# Patient Record
Sex: FEMALE | URBAN - METROPOLITAN AREA
[De-identification: names, ages, dates, MRNs, and addresses within clinical notes are randomized per-mention and may not be internally consistent; named-entity substitution may affect disease eponyms.]

---

## 2022-07-11 ENCOUNTER — APPOINTMENT (RX ONLY)
Dept: URBAN - METROPOLITAN AREA CLINIC 73 | Facility: CLINIC | Age: 62
Setting detail: DERMATOLOGY
End: 2022-07-11

## 2022-07-11 DIAGNOSIS — L24 IRRITANT CONTACT DERMATITIS: ICD-10-CM

## 2022-07-11 DIAGNOSIS — L259 CONTACT DERMATITIS AND OTHER ECZEMA, UNSPECIFIED CAUSE: ICD-10-CM

## 2022-07-11 PROBLEM — L23.9 ALLERGIC CONTACT DERMATITIS, UNSPECIFIED CAUSE: Status: ACTIVE | Noted: 2022-07-11

## 2022-07-11 PROBLEM — L24.9 IRRITANT CONTACT DERMATITIS, UNSPECIFIED CAUSE: Status: ACTIVE | Noted: 2022-07-11

## 2022-07-11 PROCEDURE — ? PATIENT SPECIFIC COUNSELING

## 2022-07-11 PROCEDURE — ? COUNSELING

## 2022-07-11 PROCEDURE — ? TREATMENT REGIMEN

## 2022-07-11 PROCEDURE — ? PRESCRIPTION

## 2022-07-11 PROCEDURE — 99203 OFFICE O/P NEW LOW 30 MIN: CPT

## 2022-07-11 RX ORDER — CLOBETASOL PROPIONATE 0.5 MG/G
CREAM TOPICAL BID
Qty: 15 | Refills: 0 | Status: ERX | COMMUNITY
Start: 2022-07-11

## 2022-07-11 RX ADMIN — CLOBETASOL PROPIONATE: 0.5 CREAM TOPICAL at 00:00

## 2022-07-11 ASSESSMENT — LOCATION DETAILED DESCRIPTION DERM
LOCATION DETAILED: 1ST WEBSPACE LEFT FOOT
LOCATION DETAILED: LEFT MEDIAL GREAT TOE
LOCATION DETAILED: LEFT LATERAL EYEBROW

## 2022-07-11 ASSESSMENT — LOCATION SIMPLE DESCRIPTION DERM
LOCATION SIMPLE: LEFT GREAT TOE
LOCATION SIMPLE: LEFT FOOT
LOCATION SIMPLE: LEFT EYEBROW

## 2022-07-11 ASSESSMENT — LOCATION ZONE DERM
LOCATION ZONE: FEET
LOCATION ZONE: TOE
LOCATION ZONE: FACE

## 2022-07-11 NOTE — HPI: RASH
How Severe Is Your Rash?: moderate
Is This A New Presentation, Or A Follow-Up?: Rash
Additional History: Patient presents with rash reported as red, itchy, and swollen that is improving over time. Patient tried OTC HC with no improvement reported.
Additional History: Patient presents with rash reported as red, itchy, and tender to the touch. Patient tried OTC HC with no improvement reported.

## 2022-07-11 NOTE — PROCEDURE: PATIENT SPECIFIC COUNSELING
Detail Level: Detailed
No rash seen today.\\nPt had tried otc cortisone and reports rash resolved\\nDisc monitor for allergen triggers and take photos of rash if recurs \\nPt reports having a garden of peppers and remembered cutting peppers
- skin is healthy today\\n- pt reports no changes to any medications except a change gabapentin dosage\\n- pt denies joint pain\\n- pt denies trouble with walking\\n- will send topical steroid to help with swelling\\n\\nskin clinically wnl today however mild swelling is noted\\nper pt only uncomfortable with pressure from sandals

## 2022-11-18 ENCOUNTER — APPOINTMENT (RX ONLY)
Dept: URBAN - METROPOLITAN AREA CLINIC 73 | Facility: CLINIC | Age: 62
Setting detail: DERMATOLOGY
End: 2022-11-18

## 2022-11-18 DIAGNOSIS — L43.8 OTHER LICHEN PLANUS: ICD-10-CM

## 2022-11-18 PROBLEM — L30.9 DERMATITIS, UNSPECIFIED: Status: ACTIVE | Noted: 2022-11-18

## 2022-11-18 PROCEDURE — ? COUNSELING

## 2022-11-18 PROCEDURE — ? PRESCRIPTION

## 2022-11-18 PROCEDURE — ? TREATMENT REGIMEN

## 2022-11-18 PROCEDURE — 99214 OFFICE O/P EST MOD 30 MIN: CPT

## 2022-11-18 PROCEDURE — ? PATIENT SPECIFIC COUNSELING

## 2022-11-18 RX ORDER — CLOBETASOL PROPIONATE 0.05 MG/G
GEL TOPICAL
Qty: 15 | Refills: 2 | Status: ERX | COMMUNITY
Start: 2022-11-18

## 2022-11-18 RX ADMIN — CLOBETASOL PROPIONATE: 0.05 GEL TOPICAL at 00:00

## 2022-11-18 ASSESSMENT — LOCATION DETAILED DESCRIPTION DERM
LOCATION DETAILED: RIGHT FLOOR OF THE MOUTH
LOCATION DETAILED: LEFT FLOOR OF THE MOUTH
LOCATION DETAILED: RIGHT HARD PALATE

## 2022-11-18 ASSESSMENT — LOCATION SIMPLE DESCRIPTION DERM
LOCATION SIMPLE: HARD PALATE
LOCATION SIMPLE: FLOOR OF THE MOUTH

## 2022-11-18 ASSESSMENT — LOCATION ZONE DERM: LOCATION ZONE: MUCOUS_MEMBRANE

## 2022-11-18 NOTE — PROCEDURE: TREATMENT REGIMEN
Initiate Treatment: Clobetasol gel Apply to affected sores twice daily until resolved.\\nMagic mouthwash (Diphenhydramine 12.5mg/5mL - maalox- 2% viscous lidocaine), 240mL - swish and spit 1-2 tsp every 6 hours prn 0 refills
Plan: Magic mouthwash was verbally called into [facility] pharmacy with pharmacist Darwin.
Detail Level: Zone

## 2022-11-18 NOTE — HPI: SKIN LESIONS
Is This A New Presentation, Or A Follow-Up?: Skin Lesions
How Severe Is Your Skin Lesion?: moderate
Additional History: Patient reports pcp referred her to dermatologist for sores inside mouth. Patient was given TAC 0.1% dental paste but reports it did not help.

## 2022-11-18 NOTE — PROCEDURE: PATIENT SPECIFIC COUNSELING
Detail Level: Detailed
denies rashes papules to skin, genitals\\n- will send Magic Mouth Wash and a stronger topical steroid \\n- pt has not seen dentist regarding rash but seeing denist in a couple of weeks\\n- if not improved, consider bx with oral surgeon

## 2022-11-18 NOTE — PROCEDURE: MIPS QUALITY
Detail Level: Detailed
Quality 130: Documentation Of Current Medications In The Medical Record: Current Medications Documented
Quality 110: Preventive Care And Screening: Influenza Immunization: Influenza Immunization not Administered for Documented Reasons.
Additional Notes: Pt declined influenza vaccination

## 2022-12-08 ENCOUNTER — APPOINTMENT (RX ONLY)
Dept: URBAN - METROPOLITAN AREA CLINIC 73 | Facility: CLINIC | Age: 62
Setting detail: DERMATOLOGY
End: 2022-12-08

## 2022-12-08 DIAGNOSIS — L43.8 OTHER LICHEN PLANUS: ICD-10-CM | Status: INADEQUATELY CONTROLLED

## 2022-12-08 PROBLEM — L30.9 DERMATITIS, UNSPECIFIED: Status: ACTIVE | Noted: 2022-12-08

## 2022-12-08 PROCEDURE — ? COUNSELING

## 2022-12-08 PROCEDURE — ? TREATMENT REGIMEN

## 2022-12-08 PROCEDURE — ? PRESCRIPTION

## 2022-12-08 PROCEDURE — 99213 OFFICE O/P EST LOW 20 MIN: CPT

## 2022-12-08 PROCEDURE — ? PATIENT SPECIFIC COUNSELING

## 2022-12-08 RX ORDER — METHYLPREDNISOLONE 4 MG/1
TABLET ORAL
Qty: 1 | Refills: 0 | Status: ERX | COMMUNITY
Start: 2022-12-08

## 2022-12-08 RX ADMIN — METHYLPREDNISOLONE: 4 TABLET ORAL at 00:00

## 2022-12-08 ASSESSMENT — LOCATION ZONE DERM: LOCATION ZONE: MUCOUS_MEMBRANE

## 2022-12-08 ASSESSMENT — LOCATION SIMPLE DESCRIPTION DERM
LOCATION SIMPLE: FLOOR OF THE MOUTH
LOCATION SIMPLE: HARD PALATE

## 2022-12-08 ASSESSMENT — LOCATION DETAILED DESCRIPTION DERM
LOCATION DETAILED: LEFT FLOOR OF THE MOUTH
LOCATION DETAILED: RIGHT FLOOR OF THE MOUTH
LOCATION DETAILED: RIGHT HARD PALATE

## 2022-12-08 NOTE — PROCEDURE: PATIENT SPECIFIC COUNSELING
Detail Level: Detailed
-pt reports improved with clobetasol gel, reflaring x 3-5 days \\n-states stopped Magic Mouth Wash as did not like numbing sensation \\n-pt has not seen dentist regarding rash but seeing denist in 2 weeks\\n-pt reports mostly irritated when eats\\n-pt reports spreading, irritation not visible today\\n-consider bx with oral surgeon- gave name of Dr. Chi at [facility]\\n-disc can cont clobetasol gel\\n-disc r/b/sed steroid dose pack- take daily dose all at once in morning\\nRTC PRN

## 2022-12-08 NOTE — PROCEDURE: TREATMENT REGIMEN
Initiate Treatment: Medrol (Doc) 4 mg tablets in a dose pack \\nQuantity: 1.0 Packet\\nSig: Take as directed
Continue Regimen: Clobetasol gel Apply to affected sores twice daily until resolved.
Detail Level: Zone